# Patient Record
Sex: MALE | Race: WHITE | NOT HISPANIC OR LATINO | Employment: FULL TIME | URBAN - METROPOLITAN AREA
[De-identification: names, ages, dates, MRNs, and addresses within clinical notes are randomized per-mention and may not be internally consistent; named-entity substitution may affect disease eponyms.]

---

## 2019-02-14 ENCOUNTER — HOSPITAL ENCOUNTER (EMERGENCY)
Facility: HOSPITAL | Age: 34
Discharge: HOME | End: 2019-02-14
Attending: EMERGENCY MEDICINE | Admitting: EMERGENCY MEDICINE

## 2019-02-14 VITALS
TEMPERATURE: 97.1 F | SYSTOLIC BLOOD PRESSURE: 137 MMHG | OXYGEN SATURATION: 99 % | RESPIRATION RATE: 16 BRPM | DIASTOLIC BLOOD PRESSURE: 96 MMHG | WEIGHT: 235 LBS | HEART RATE: 88 BPM | BODY MASS INDEX: 33.64 KG/M2 | HEIGHT: 70 IN

## 2019-02-14 DIAGNOSIS — B34.9 VIRAL SYNDROME: Primary | ICD-10-CM

## 2019-02-14 PROCEDURE — 99282 EMERGENCY DEPT VISIT SF MDM: CPT

## 2019-02-14 RX ORDER — OSELTAMIVIR PHOSPHATE 75 MG/1
75 CAPSULE ORAL 2 TIMES DAILY
Qty: 10 CAPSULE | Refills: 0 | Status: SHIPPED | OUTPATIENT
Start: 2019-02-14 | End: 2019-02-19

## 2019-02-14 RX ORDER — ONDANSETRON 4 MG/1
4-8 TABLET, ORALLY DISINTEGRATING ORAL EVERY 8 HOURS PRN
Qty: 15 TABLET | Refills: 0 | Status: SHIPPED | OUTPATIENT
Start: 2019-02-14

## 2019-02-14 NOTE — ED NOTES
"Patient states \"yesterday I had flu like symptoms, and basically I just need a Drs note. I am in the  and can only go to [place] or the ER.\"     Mendy Fabian  02/14/19 9502    "

## 2019-02-14 NOTE — ED NOTES
"Patient walked to exam room 30, stated on way to room \"I really just need a work note, works policy is you can have 1 day off, then the next day you need a Drs note, and I can not drive to [place] right now\"  Report given to Mendy Vazquez RN  02/14/19 0737    "

## 2019-02-14 NOTE — ED PROVIDER NOTES
EMERGENCY DEPARTMENT ENCOUNTER    Room Number:  30/30  Date seen:  2/14/2019  Time seen: 7:36 AM  PCP: Pedro Pablo Pollack PA    HPI:  Chief complaint: Flu-like symptoms  Context:Chris Barry is a 33 y.o. male who presents to the ED with c/o influenza-like symptoms including generalized myalgias, HA, fever, rhinorrhea, and N/V/D for 2 days ago. Pt took Tylenol at 5:00AM for his fever. Pt has been able to tolerate fluids PO. He denies abd pain, sore throat, cough, SOA, and CP. He states his wife has been sick with similar sx. He's in the  and states he came to the ER because he has to have a work note for today.    Onset: gradual  Location:generalized  Duration: 2 days  Timing: constant  Character: flu-like symptoms  Aggravating Factors: pt has been around his wife that was sick  Alleviating Factors: none  Severity: moderate      ALLERGIES  Patient has no known allergies.    PAST MEDICAL HISTORY  Active Ambulatory Problems     Diagnosis Date Noted   • No Active Ambulatory Problems     Resolved Ambulatory Problems     Diagnosis Date Noted   • No Resolved Ambulatory Problems     No Additional Past Medical History       PAST SURGICAL HISTORY  No past surgical history on file.    FAMILY HISTORY  No family history on file.    SOCIAL HISTORY  Social History     Socioeconomic History   • Marital status:      Spouse name: Not on file   • Number of children: Not on file   • Years of education: Not on file   • Highest education level: Not on file   Social Needs   • Financial resource strain: Not on file   • Food insecurity - worry: Not on file   • Food insecurity - inability: Not on file   • Transportation needs - medical: Not on file   • Transportation needs - non-medical: Not on file   Occupational History   • Not on file   Tobacco Use   • Smoking status: Never Smoker   Substance and Sexual Activity   • Alcohol use: No   • Drug use: No   • Sexual activity: Not on file   Other Topics Concern   • Not on file    Social History Narrative   • Not on file       REVIEW OF SYSTEMS  Review of Systems   Constitutional: Positive for fever. Negative for chills.   HENT: Positive for rhinorrhea. Negative for congestion and sore throat.    Eyes: Negative.    Respiratory: Negative for cough and shortness of breath.    Cardiovascular: Negative for chest pain.   Gastrointestinal: Positive for diarrhea, nausea and vomiting. Negative for abdominal pain.   Genitourinary: Negative.    Musculoskeletal: Positive for myalgias (generalized).   Skin: Negative.    Neurological: Positive for headaches.   Psychiatric/Behavioral: Negative.        PHYSICAL EXAM  ED Triage Vitals [02/14/19 0734]   Temp Heart Rate Resp BP SpO2   97.1 °F (36.2 °C) 88 16 -- 99 %      Temp src Heart Rate Source Patient Position BP Location FiO2 (%)   Tympanic Monitor -- -- --     Physical Exam   Constitutional: He is oriented to person, place, and time and well-developed, well-nourished, and in no distress.   HENT:   Head: Normocephalic and atraumatic.   Right Ear: Tympanic membrane and external ear normal.   Left Ear: Tympanic membrane and external ear normal.   Nose: Nose normal.   Mouth/Throat: Uvula is midline and oropharynx is clear and moist. No oropharyngeal exudate, posterior oropharyngeal edema or posterior oropharyngeal erythema.   Eyes: Conjunctivae are normal.   Neck: Normal range of motion. Neck supple.   Cardiovascular: Normal rate and regular rhythm.   Pulmonary/Chest: Effort normal and breath sounds normal.   Abdominal:   Normal bowel sounds  Soft  Nontender  Nondistended  No rebound, guarding, or rigidity  No appreciable organomegaly  No CVA tenderness   Musculoskeletal: Normal range of motion.   Neurological: He is alert and oriented to person, place, and time.   Skin: Skin is warm and dry.   Psychiatric: Affect normal.   Nursing note and vitals reviewed.      PROGRESS AND CONSULTS   7:45 AM  Offered patient workup for symptoms, he declines. He states he  "feels to unwell to go to work, but otherwise would not have come to the ER. Discussed plan to discharge with Tamiflu due to flu-like symptoms, Zofran for nausea and work note. Pt understands and agrees with the plan, all questions answered. Advised pt to rest and increase fluid intake.  Return precautions and follow up instructions discussed.   Reviewed pt's history and workup with Dr. Fernandez.  After a bedside evaluation; Dr. Fernandez agrees with the plan of care      Disposition vitals:  /96   Pulse 88   Temp 97.1 °F (36.2 °C) (Tympanic)   Resp 16   Ht 177.8 cm (70\")   Wt 107 kg (235 lb)   SpO2 99%   BMI 33.72 kg/m²       DIAGNOSIS  Final diagnoses:   Viral syndrome       DISCHARGE    Patient discharged in stable condition.    Reviewed implications of results, diagnosis, meds, responsibility to follow up, warning signs and symptoms of possible worsening, potential complications and reasons to return to ER.    Patient/Family voiced understanding of above instructions.    Discussed plan for discharge, as there is no emergent indication for admission. Patient referred to primary care provider for BP management due to today's BP. Pt/family is agreeable and understands need for follow up and repeat testing.  Pt is aware that discharge does not mean that nothing is wrong but it indicates no emergency is present that requires admission and they must continue care with follow-up as given below or physician of their choice.     FOLLOW-UP  Pedro Pablo Pollack, PA  [address] [id number] [phone]    In 3 days           Medication List      New Prescriptions    ondansetron ODT 4 MG disintegrating tablet  Commonly known as:  ZOFRAN-ODT  Take 1-2 tablets by mouth Every 8 (Eight) Hours As Needed for Nausea or   Vomiting.     oseltamivir 75 MG capsule  Commonly known as:  TAMIFLU  Take 1 capsule by mouth 2 (Two) Times a Day for 5 days.              Documentation assistance provided by john " Alina Cook for Argelia Ojeda PA-C.  Information recorded by the scribe was done at my direction and has been verified and validated by me.         Alina Cook  02/14/19 0758       Argelia Ojeda PA  02/19/19 0804       Argelia Ojeda PA  02/19/19 0806

## 2019-02-14 NOTE — ED PROVIDER NOTES
"Pt presents to the ED c/o flu-like symptoms that started 2 days ago. Pt c/o generalized myalgias, \"pounding\" headache, fever T-max 102, diarrhea, vomiting, and rhinorrhea. Pt denies sore throat and cough. Pt states he has been around his wife who was recently diagnosed with the flu and had similar symptoms to the pt. Pt states he took tylenol this morning.      PHYSICAL EXAM  GENERAL: non-toxic appearing  HENT: oropharynx benign, moist mucous membranes  NECK: supple  RESPIRATORY: lungs clear  ABDOMEN: soft, non-tender    Vital signs and nursing notes reviewed.    LAB RESULTS AND RADIOLOGY  I have reviewed the patient's labs and imaging studies.    PROGRESS NOTES    Spoke to midlevel provider , [place] Ojeda PA-C, about the pt. After performing my own physical exam, I will order the pt tamiflu and discharge the pt home.     Attestation:    The JOYCELYN and I have discussed this patient's history, physical exam, and treatment plan.  I have reviewed the documentation and personally had a face to face interaction with the patient. I affirm the documentation and agree with the treatment and plan.  The attached note describes my personal findings.    Documentation assistance provided by john Albert for Dr. Fernandez.  Information recorded by the scribe was done at my direction and has been verified and validated by me.     Shayan Albert  02/14/19 6113       Evangelist Fernandez MD  02/14/19 9636    "

## 2019-02-14 NOTE — DISCHARGE INSTRUCTIONS
Rest and drink plenty of fluids. Progress your diet slowly as tolerated.  Recheck with your PCP   Take the medications as prescribed.  If you have severe diarrhea, Imodium may help, but could also prolong your diarrhea symptoms.  Return to the ER for severe pain, intractable vomiting, fever >100.4, new or worsening symptoms, any concerns.

## 2019-04-24 ENCOUNTER — HOSPITAL ENCOUNTER (EMERGENCY)
Facility: HOSPITAL | Age: 34
Discharge: HOME | End: 2019-04-24
Attending: EMERGENCY MEDICINE | Admitting: EMERGENCY MEDICINE

## 2019-04-24 VITALS
DIASTOLIC BLOOD PRESSURE: 75 MMHG | RESPIRATION RATE: 16 BRPM | WEIGHT: 200 LBS | SYSTOLIC BLOOD PRESSURE: 124 MMHG | HEIGHT: 70 IN | BODY MASS INDEX: 28.63 KG/M2 | TEMPERATURE: 99.6 F | HEART RATE: 90 BPM | OXYGEN SATURATION: 96 %

## 2019-04-24 DIAGNOSIS — J02.0 STREP PHARYNGITIS: Primary | ICD-10-CM

## 2019-04-24 LAB — S PYO AG THROAT QL: POSITIVE

## 2019-04-24 PROCEDURE — 25010000002 PENICILLIN G BENZATHINE PER 1200000 UNITS: Performed by: EMERGENCY MEDICINE

## 2019-04-24 PROCEDURE — 96372 THER/PROPH/DIAG INJ SC/IM: CPT

## 2019-04-24 PROCEDURE — 99283 EMERGENCY DEPT VISIT LOW MDM: CPT

## 2019-04-24 PROCEDURE — 87880 STREP A ASSAY W/OPTIC: CPT | Performed by: EMERGENCY MEDICINE

## 2019-04-24 RX ORDER — ACETAMINOPHEN 500 MG
1000 TABLET ORAL ONCE
Status: COMPLETED | OUTPATIENT
Start: 2019-04-24 | End: 2019-04-24

## 2019-04-24 RX ADMIN — PENICILLIN G BENZATHINE 1.2 MILLION UNITS: 1200000 INJECTION, SUSPENSION INTRAMUSCULAR at 09:11

## 2019-04-24 RX ADMIN — ACETAMINOPHEN 1000 MG: 500 TABLET, FILM COATED ORAL at 09:10

## 2019-10-04 ENCOUNTER — APPOINTMENT (OUTPATIENT)
Dept: CT IMAGING | Facility: HOSPITAL | Age: 34
End: 2019-10-04

## 2019-10-04 ENCOUNTER — HOSPITAL ENCOUNTER (EMERGENCY)
Facility: HOSPITAL | Age: 34
Discharge: HOME | End: 2019-10-04
Attending: EMERGENCY MEDICINE | Admitting: EMERGENCY MEDICINE

## 2019-10-04 VITALS
WEIGHT: 210 LBS | TEMPERATURE: 97.7 F | RESPIRATION RATE: 18 BRPM | DIASTOLIC BLOOD PRESSURE: 83 MMHG | OXYGEN SATURATION: 98 % | HEIGHT: 70 IN | BODY MASS INDEX: 30.06 KG/M2 | SYSTOLIC BLOOD PRESSURE: 130 MMHG | HEART RATE: 65 BPM

## 2019-10-04 DIAGNOSIS — K52.9 COLITIS: Primary | ICD-10-CM

## 2019-10-04 DIAGNOSIS — R19.7 BLOODY DIARRHEA: ICD-10-CM

## 2019-10-04 LAB
ADV 40+41 DNA STL QL NAA+NON-PROBE: NOT DETECTED
ALBUMIN SERPL-MCNC: 4.5 G/DL (ref 3.5–5.2)
ALBUMIN/GLOB SERPL: 1.7 G/DL
ALP SERPL-CCNC: 64 U/L (ref 39–117)
ALT SERPL W P-5'-P-CCNC: 32 U/L (ref 1–41)
ANION GAP SERPL CALCULATED.3IONS-SCNC: 10 MMOL/L (ref 5–15)
AST SERPL-CCNC: 28 U/L (ref 1–40)
ASTRO TYP 1-8 RNA STL QL NAA+NON-PROBE: NOT DETECTED
BASOPHILS # BLD AUTO: 0.04 10*3/MM3 (ref 0–0.2)
BASOPHILS NFR BLD AUTO: 0.3 % (ref 0–1.5)
BILIRUB SERPL-MCNC: 0.4 MG/DL (ref 0.2–1.2)
BUN BLD-MCNC: 17 MG/DL (ref 6–20)
BUN/CREAT SERPL: 15.2 (ref 7–25)
C CAYETANENSIS DNA STL QL NAA+NON-PROBE: NOT DETECTED
CALCIUM SPEC-SCNC: 8.8 MG/DL (ref 8.6–10.5)
CAMPY SP DNA.DIARRHEA STL QL NAA+PROBE: NOT DETECTED
CHLORIDE SERPL-SCNC: 101 MMOL/L (ref 98–107)
CO2 SERPL-SCNC: 26 MMOL/L (ref 22–29)
CREAT BLD-MCNC: 1.12 MG/DL (ref 0.76–1.27)
CRYPTOSP STL CULT: NOT DETECTED
DEPRECATED RDW RBC AUTO: 41.8 FL (ref 37–54)
E COLI DNA SPEC QL NAA+PROBE: NOT DETECTED
E HISTOLYT AG STL-ACNC: NOT DETECTED
EAEC PAA PLAS AGGR+AATA ST NAA+NON-PRB: NOT DETECTED
EC STX1 + STX2 GENES STL NAA+PROBE: NOT DETECTED
EOSINOPHIL # BLD AUTO: 0.14 10*3/MM3 (ref 0–0.4)
EOSINOPHIL NFR BLD AUTO: 1.1 % (ref 0.3–6.2)
EPEC EAE GENE STL QL NAA+NON-PROBE: NOT DETECTED
ERYTHROCYTE [DISTWIDTH] IN BLOOD BY AUTOMATED COUNT: 13.8 % (ref 12.3–15.4)
ETEC LTA+ST1A+ST1B TOX ST NAA+NON-PROBE: NOT DETECTED
EXPIRATION DATE: ABNORMAL
FECAL OCCULT BLOOD SCREEN, POC: POSITIVE
G LAMBLIA DNA SPEC QL NAA+PROBE: NOT DETECTED
GFR SERPL CREATININE-BSD FRML MDRD: 76 ML/MIN/1.73
GLOBULIN UR ELPH-MCNC: 2.6 GM/DL
GLUCOSE BLD-MCNC: 90 MG/DL (ref 65–99)
HCT VFR BLD AUTO: 40.2 % (ref 37.5–51)
HGB BLD-MCNC: 13.4 G/DL (ref 13–17.7)
HOLD SPECIMEN: NORMAL
IMM GRANULOCYTES # BLD AUTO: 0.04 10*3/MM3 (ref 0–0.05)
IMM GRANULOCYTES NFR BLD AUTO: 0.3 % (ref 0–0.5)
LYMPHOCYTES # BLD AUTO: 3.6 10*3/MM3 (ref 0.7–3.1)
LYMPHOCYTES NFR BLD AUTO: 27.7 % (ref 19.6–45.3)
Lab: 128
MCH RBC QN AUTO: 27.6 PG (ref 26.6–33)
MCHC RBC AUTO-ENTMCNC: 33.3 G/DL (ref 31.5–35.7)
MCV RBC AUTO: 82.9 FL (ref 79–97)
MONOCYTES # BLD AUTO: 1.12 10*3/MM3 (ref 0.1–0.9)
MONOCYTES NFR BLD AUTO: 8.6 % (ref 5–12)
NEGATIVE CONTROL: NEGATIVE
NEUTROPHILS # BLD AUTO: 8.04 10*3/MM3 (ref 1.7–7)
NEUTROPHILS NFR BLD AUTO: 62 % (ref 42.7–76)
NOROVIRUS GI+II RNA STL QL NAA+NON-PROBE: NOT DETECTED
NRBC BLD AUTO-RTO: 0 /100 WBC (ref 0–0.2)
P SHIGELLOIDES DNA STL QL NAA+PROBE: NOT DETECTED
PLATELET # BLD AUTO: 325 10*3/MM3 (ref 140–450)
PMV BLD AUTO: 9.3 FL (ref 6–12)
POSITIVE CONTROL: POSITIVE
POTASSIUM BLD-SCNC: 3.7 MMOL/L (ref 3.5–5.2)
PROT SERPL-MCNC: 7.1 G/DL (ref 6–8.5)
RBC # BLD AUTO: 4.85 10*6/MM3 (ref 4.14–5.8)
RV RNA STL NAA+PROBE: NOT DETECTED
SALMONELLA DNA SPEC QL NAA+PROBE: NOT DETECTED
SAPO I+II+IV+V RNA STL QL NAA+NON-PROBE: NOT DETECTED
SHIGELLA SP+EIEC IPAH STL QL NAA+PROBE: NOT DETECTED
SODIUM BLD-SCNC: 137 MMOL/L (ref 136–145)
V CHOLERAE DNA SPEC QL NAA+PROBE: NOT DETECTED
VIBRIO DNA SPEC NAA+PROBE: NOT DETECTED
WBC NRBC COR # BLD: 12.98 10*3/MM3 (ref 3.4–10.8)
WHOLE BLOOD HOLD SPECIMEN: NORMAL
YERSINIA STL CULT: NOT DETECTED

## 2019-10-04 PROCEDURE — 99283 EMERGENCY DEPT VISIT LOW MDM: CPT

## 2019-10-04 PROCEDURE — 25010000002 IOPAMIDOL 61 % SOLUTION: Performed by: EMERGENCY MEDICINE

## 2019-10-04 PROCEDURE — 85025 COMPLETE CBC W/AUTO DIFF WBC: CPT | Performed by: EMERGENCY MEDICINE

## 2019-10-04 PROCEDURE — 96374 THER/PROPH/DIAG INJ IV PUSH: CPT

## 2019-10-04 PROCEDURE — 0097U HC BIOFIRE FILMARRAY GI PANEL: CPT | Performed by: EMERGENCY MEDICINE

## 2019-10-04 PROCEDURE — 82270 OCCULT BLOOD FECES: CPT | Performed by: EMERGENCY MEDICINE

## 2019-10-04 PROCEDURE — 80053 COMPREHEN METABOLIC PANEL: CPT | Performed by: EMERGENCY MEDICINE

## 2019-10-04 PROCEDURE — 25010000002 ONDANSETRON PER 1 MG: Performed by: EMERGENCY MEDICINE

## 2019-10-04 PROCEDURE — 74177 CT ABD & PELVIS W/CONTRAST: CPT

## 2019-10-04 RX ORDER — SODIUM CHLORIDE 0.9 % (FLUSH) 0.9 %
10 SYRINGE (ML) INJECTION AS NEEDED
Status: DISCONTINUED | OUTPATIENT
Start: 2019-10-04 | End: 2019-10-04 | Stop reason: HOSPADM

## 2019-10-04 RX ORDER — CIPROFLOXACIN 500 MG/1
500 TABLET, FILM COATED ORAL 2 TIMES DAILY
Qty: 14 TABLET | Refills: 0 | Status: SHIPPED | OUTPATIENT
Start: 2019-10-04 | End: 2019-10-11

## 2019-10-04 RX ORDER — ONDANSETRON 2 MG/ML
4 INJECTION INTRAMUSCULAR; INTRAVENOUS ONCE
Status: COMPLETED | OUTPATIENT
Start: 2019-10-04 | End: 2019-10-04

## 2019-10-04 RX ORDER — METRONIDAZOLE 500 MG/1
500 TABLET ORAL 4 TIMES DAILY
Qty: 28 TABLET | Refills: 0 | Status: SHIPPED | OUTPATIENT
Start: 2019-10-04 | End: 2019-10-11

## 2019-10-04 RX ADMIN — SODIUM CHLORIDE, POTASSIUM CHLORIDE, SODIUM LACTATE AND CALCIUM CHLORIDE 1000 ML: 600; 310; 30; 20 INJECTION, SOLUTION INTRAVENOUS at 16:45

## 2019-10-04 RX ADMIN — IOPAMIDOL 85 ML: 612 INJECTION, SOLUTION INTRAVENOUS at 20:03

## 2019-10-04 RX ADMIN — ONDANSETRON 4 MG: 2 INJECTION INTRAMUSCULAR; INTRAVENOUS at 16:45

## 2019-10-04 NOTE — ED NOTES
Pt c/o nausea and diarrhea 2days ago. Yesterday symptoms subsided. This afternoon pt reports diarrhea with lower abd pain. Reports blood on stool. Denies fever. Pt flushed. Bowel sounds present in all quadrants. Pt tender to lower abd pain     Deb Yee, RN  10/04/19 0012

## 2019-10-04 NOTE — ED PROVIDER NOTES
EMERGENCY DEPARTMENT ENCOUNTER    Room Number:  36/36  Date seen:  10/4/2019  Time seen: 4:24 PM  PCP: Provider, No Known  Historian: patient    HPI:  Chief Complaint: diarrhea  A complete HPI/ROS/PMH/PSH/SH/FH are unobtainable due to: nothing  Context: Chris Barry is a 34 y.o. male who presents to the ED c/o diarrhea that began two days ago. The patient reports he has had 12 episodes of diarrhea within the past 24 hours. The patient also complains of associated nausea and suprapubic abdominal pain. The patient reports his symptoms subsided yesterday evening but then returned around 1200 this afternoon. The patient also reports he noticed blood in his stool earlier this afternoon, so he came to the ER for further evaluation. The patient denies vomiting or fever. The patient also denies recent travel outside the country, exposure to farm animals, hospitalizations, or antibiotic use. There are no other complaints at this time.    Location: GI  Quality: diarrhea  Intensity/Severity: moderate  Duration: two days  Onset quality: gradual  Timing: episodic  Progression: none specified  Aggravating Factors: none specified  Alleviating Factors: none specified  Previous Episodes: none specified  Treatment before arrival: none specified  Associated Symptoms: nausea, suprapubic abdominal pain, and blood in his stool    PAST MEDICAL HISTORY  Active Ambulatory Problems     Diagnosis Date Noted   • No Active Ambulatory Problems     Resolved Ambulatory Problems     Diagnosis Date Noted   • No Resolved Ambulatory Problems     Past Medical History:   Diagnosis Date   • Psoriasis          PAST SURGICAL HISTORY  Past Surgical History:   Procedure Laterality Date   • CYST REMOVAL     • FOOT SURGERY Left    • TOE SURGERY           FAMILY HISTORY  History reviewed. No pertinent family history.      SOCIAL HISTORY  Social History     Socioeconomic History   • Marital status:      Spouse name: Not on file   • Number of children:  Not on file   • Years of education: Not on file   • Highest education level: Not on file   Tobacco Use   • Smoking status: Never Smoker   • Smokeless tobacco: Never Used   Substance and Sexual Activity   • Alcohol use: No   • Drug use: No   • Sexual activity: Defer         ALLERGIES  Patient has no known allergies.        REVIEW OF SYSTEMS  Review of Systems     All systems reviewed and negative except for those discussed in HPI.       PHYSICAL EXAM  ED Triage Vitals [10/04/19 1612]   Temp Heart Rate Resp BP SpO2   97.7 °F (36.5 °C) 78 18 -- 98 %      Temp src Heart Rate Source Patient Position BP Location FiO2 (%)   -- -- -- -- --         GENERAL: not distressed  HENT: nares patent, slightly dry mucous membranes  EYES: no scleral icterus  CV: regular rhythm, regular rate  RESPIRATORY: normal effort  ABDOMEN: soft, nontender   : performed chaperoned rectal exam with RN Ioana bedside, there is brown stool which is heme occult positive, no external hemorrhoids appreciated  MUSCULOSKELETAL: no deformity  NEURO: alert, moves all extremities, follows commands  SKIN: warm, dry    Vital signs and nursing notes reviewed.      LAB RESULTS  Recent Results (from the past 24 hour(s))   Light Blue Top    Collection Time: 10/04/19  4:33 PM   Result Value Ref Range    Extra Tube hold for add-on    Gold Top - SST    Collection Time: 10/04/19  4:33 PM   Result Value Ref Range    Extra Tube Hold for add-ons.    Comprehensive Metabolic Panel    Collection Time: 10/04/19  4:34 PM   Result Value Ref Range    Glucose 90 65 - 99 mg/dL    BUN 17 6 - 20 mg/dL    Creatinine 1.12 0.76 - 1.27 mg/dL    Sodium 137 136 - 145 mmol/L    Potassium 3.7 3.5 - 5.2 mmol/L    Chloride 101 98 - 107 mmol/L    CO2 26.0 22.0 - 29.0 mmol/L    Calcium 8.8 8.6 - 10.5 mg/dL    Total Protein 7.1 6.0 - 8.5 g/dL    Albumin 4.50 3.50 - 5.20 g/dL    ALT (SGPT) 32 1 - 41 U/L    AST (SGOT) 28 1 - 40 U/L    Alkaline Phosphatase 64 39 - 117 U/L    Total Bilirubin 0.4  0.2 - 1.2 mg/dL    eGFR Non African Amer 76 >60 mL/min/1.73    Globulin 2.6 gm/dL    A/G Ratio 1.7 g/dL    BUN/Creatinine Ratio 15.2 7.0 - 25.0    Anion Gap 10.0 5.0 - 15.0 mmol/L   CBC Auto Differential    Collection Time: 10/04/19  4:34 PM   Result Value Ref Range    WBC 12.98 (H) 3.40 - 10.80 10*3/mm3    RBC 4.85 4.14 - 5.80 10*6/mm3    Hemoglobin 13.4 13.0 - 17.7 g/dL    Hematocrit 40.2 37.5 - 51.0 %    MCV 82.9 79.0 - 97.0 fL    MCH 27.6 26.6 - 33.0 pg    MCHC 33.3 31.5 - 35.7 g/dL    RDW 13.8 12.3 - 15.4 %    RDW-SD 41.8 37.0 - 54.0 fl    MPV 9.3 6.0 - 12.0 fL    Platelets 325 140 - 450 10*3/mm3    Neutrophil % 62.0 42.7 - 76.0 %    Lymphocyte % 27.7 19.6 - 45.3 %    Monocyte % 8.6 5.0 - 12.0 %    Eosinophil % 1.1 0.3 - 6.2 %    Basophil % 0.3 0.0 - 1.5 %    Immature Grans % 0.3 0.0 - 0.5 %    Neutrophils, Absolute 8.04 (H) 1.70 - 7.00 10*3/mm3    Lymphocytes, Absolute 3.60 (H) 0.70 - 3.10 10*3/mm3    Monocytes, Absolute 1.12 (H) 0.10 - 0.90 10*3/mm3    Eosinophils, Absolute 0.14 0.00 - 0.40 10*3/mm3    Basophils, Absolute 0.04 0.00 - 0.20 10*3/mm3    Immature Grans, Absolute 0.04 0.00 - 0.05 10*3/mm3    nRBC 0.0 0.0 - 0.2 /100 WBC   POC Occult Blood Stool    Collection Time: 10/04/19  4:41 PM   Result Value Ref Range    Fecal Occult Blood Positive (A) Negative    Lot Number [id number]     Expiration Date 05/31/2020     Positive Control Positive Positive    Negative Control Negative Negative   Gastrointestinal Panel, PCR - Stool, Per Rectum    Collection Time: 10/04/19  5:20 PM   Result Value Ref Range    Campylobacter Not Detected Not Detected, Invalid    Plesiomonas shigelloides Not Detected Not Detected    Salmonella Not Detected Not Detected    Vibrio Not Detected Not Detected    Vibrio cholerae Not Detected Not Detected    Yersinia enterocolitica Not Detected Not Detected    Enteroaggregative E. coli (EAEC) Not Detected Not Detected    Enteropathogenic E. coli (EPEC) Not Detected Not Detected     Enterotoxigenic E. coli (ETEC) lt/st Not Detected Not Detected    Shiga-like toxin-producing E. coli (STEC) stx1/stx2 Not Detected Not Detected    E. coli O157 Not Detected Not Detected    Shigella/Enteroinvasive E. coli (EIEC) Not Detected Not Detected    Cryptosporidium Not Detected Not Detected, Invalid    Cyclospora cayetanensis Not Detected Not Detected    Entamoeba histolytica Not Detected Not Detected    Giardia lamblia Not Detected Not Detected    Adenovirus F40/41 Not Detected Not Detected    Astrovirus Not Detected Not Detected    Norovirus GI/GII Not Detected Not Detected    Rotavirus A Not Detected Not Detected    Sapovirus (I, II, IV or V) Not Detected Not Detected       Ordered the above labs and reviewed the results.        RADIOLOGY  Ct Abdomen Pelvis With Contrast    Result Date: 10/4/2019  CT OF THE ABDOMEN AND PELVIS WITH CONTRAST 10/04/2019  HISTORY: Low abdominal pain.  TECHNIQUE: Axial images were obtained from the lung bases to the symphysis pubis after intravenous contrast.  FINDINGS: The liver, gallbladder, spleen, pancreas, adrenals and kidneys appear unremarkable.  There is wall thickening of the mid to distal descending colon and of the rectosigmoid colon.  No abscess or free air is seen. Urinary bladder is unremarkable.      Findings consistent with mild-to-moderate colitis of the mid to distal descending and rectosigmoid colon.  Radiation dose reduction techniques were utilized, including automated exposure control and exposure modulation based on body size.         I ordered the above noted radiological studies. Reviewed by me and discussed with radiologist.  See dictation for official radiology interpretation.        PROCEDURES  Procedures      MEDICATIONS GIVEN IN ER  Medications   sodium chloride 0.9 % flush 10 mL (not administered)   lactated ringers bolus 1,000 mL (0 mL Intravenous Stopped 10/4/19 1927)   ondansetron (ZOFRAN) injection 4 mg (4 mg Intravenous Given 10/4/19 1645)    iopamidol (ISOVUE-300) 61 % injection 100 mL (85 mL Intravenous Given by Other 10/4/19 2003)         PROGRESS, DATA ANALYSIS, CONSULTS, AND MEDICAL DECISION MAKING    All labs have been independently reviewed by me.  All radiology studies have been reviewed by me and discussed with radiologist dictating the report.   EKG's independently viewed and interpreted by me.  Discussion below represents my analysis of pertinent findings related to patient's condition, differential diagnosis, treatment plan and final disposition.      ED Course as of Oct 04 2032   Fri Oct 04, 2019   1654 Fecal Occult Blood: (!) Positive [TD]   1654 WBC: (!) 12.98 [TD]   2012 Given the patient's abdominal pain without an infectious etiology notified on his stool studies combined with bloody diarrhea, I have ordered a CT scan to evaluate for ischemic colitis.  [TD]      ED Course User Index  [TD] Derik Adan II, MD     CT scan shows colitis.  No other intra-abdominal pathology.    I spoke with Dr. kennedy, radiology to discuss the CT scan findings.      Patient appears to be clinically hydrated at this time.  He is tolerating p.o.  I believe that he is a good candidate for outpatient management given that he is young, healthy and clinically well-appearing.      Differential diagnosis includes virus, bacteria, ischemic colitis, ulcerative colitis or other inflammatory bowel disease.  I discussed the patient return precautions in detail.  Also discussed indications for follow-up with GI as this could be an initial presentation for inflammatory bowel disease.    1909 Rechecked the patient who is resting comfortably and in NAD. Patient is stable. BP- 139/85 HR- 65 Temp- 97.7 °F (36.5 °C) O2 sat- 98%. Informed the patient of his blood work which shows a WBC of 12.98. Also informed the patient of his negative GI Panel and his positive Fecal Occult test. Discussed the plan to check a CT Abd/Pelvis for further evaluation. Pt understands and  agrees with the plan, all questions answered.    2033 Rechecked the patient who is resting comfortably and in NAD. Patient is stable. BP- 130/83 HR- 65 Temp- 97.7 °F (36.5 °C) O2 sat- 98%. Informed the patient of his CT Abd/Pelvis which shows mild-to-moderate colitis of the mid to distal descending and rectosigmoid colon. Discussed the plan for discharge with a prescription for Cipro and Flagyl (to treat colitis) and instructions to f/u with GI for further evaluation and management. Strict RTER warnings given. Pt understands and agrees with the plan, all questions answered.      AS OF 8:32 PM VITALS:    BP - 130/83  HR - 65  TEMP - 97.7 °F (36.5 °C)  02 SATS - 98%        DIAGNOSIS  Final diagnoses:   Colitis   Bloody diarrhea         DISPOSITION  DISCHARGE    Patient discharged in stable condition.    Reviewed implications of results, diagnosis, meds, responsibility to follow up, warning signs and symptoms of possible worsening, potential complications and reasons to return to ER, including any new or worsening symptoms.    Patient/Family voiced understanding of above instructions.    Discussed plan for discharge, as there is no emergent indication for admission. Patient referred to primary care provider for BP management due to today's BP. Pt/family is agreeable and understands need for follow up and repeat testing.  Pt is aware that discharge does not mean that nothing is wrong but it indicates no emergency is present that requires admission and they must continue care with follow-up as given below or physician of their choice.     FOLLOW-UP  Melissa Appiah MD  [address] [address]  [phone]    Schedule an appointment as soon as possible for a visit   If symptoms worsen         Medication List      New Prescriptions    ciprofloxacin 500 MG tablet  Commonly known as:  CIPRO  Take 1 tablet by mouth 2 (Two) Times a Day for 7 days.     metroNIDAZOLE 500 MG tablet  Commonly known as:   FLAGYL  Take 1 tablet by mouth 4 (Four) Times a Day for 7 days.              --  Documentation assistance provided by john Wayne for Dr. Derik Adan MD.  Information recorded by the scribe was done at my direction and has been verified and validated by me.     Kelly Wayne  10/04/19 2038       Derik Adan II, MD  10/04/19 8421

## 2020-01-06 ENCOUNTER — APPOINTMENT (OUTPATIENT)
Dept: CT IMAGING | Facility: HOSPITAL | Age: 35
End: 2020-01-06

## 2020-01-06 ENCOUNTER — HOSPITAL ENCOUNTER (EMERGENCY)
Facility: HOSPITAL | Age: 35
Discharge: HOME | End: 2020-01-06
Attending: EMERGENCY MEDICINE | Admitting: EMERGENCY MEDICINE

## 2020-01-06 VITALS
SYSTOLIC BLOOD PRESSURE: 134 MMHG | TEMPERATURE: 96.7 F | HEIGHT: 70 IN | HEART RATE: 57 BPM | RESPIRATION RATE: 18 BRPM | BODY MASS INDEX: 30.13 KG/M2 | OXYGEN SATURATION: 99 % | DIASTOLIC BLOOD PRESSURE: 88 MMHG

## 2020-01-06 DIAGNOSIS — F07.81 POST CONCUSSIVE SYNDROME: Primary | ICD-10-CM

## 2020-01-06 PROCEDURE — 99283 EMERGENCY DEPT VISIT LOW MDM: CPT

## 2020-01-06 PROCEDURE — 70450 CT HEAD/BRAIN W/O DYE: CPT

## 2020-01-07 NOTE — ED PROVIDER NOTES
EMERGENCY DEPARTMENT ENCOUNTER    CHIEF COMPLAINT  Chief Complaint: head pain  History given by: patient  History limited by: none  Room Number: 02/02  PMD: Provider, No Known      HPI:  Pt is a 34 y.o. male who presents complaining of pain to top of head s/p having a 60 pound mitre saw fall on the top of his head off of a shelf occurring today PTA. Pt reports head pain was gradual in onset. Pt also complains of nausea but denies LOC. Pt states wife noticed that he was not acting himself and had slurred speech. Pt confirms consuming 1 drink of EtOH earlier today.     Duration:  Today PTA  Onset: gradual  Location: top of head  Associated Symptoms: nausea, slurred speech, not acting himself    PAST MEDICAL HISTORY  Active Ambulatory Problems     Diagnosis Date Noted   • No Active Ambulatory Problems     Resolved Ambulatory Problems     Diagnosis Date Noted   • No Resolved Ambulatory Problems     Past Medical History:   Diagnosis Date   • Psoriasis        PAST SURGICAL HISTORY  Past Surgical History:   Procedure Laterality Date   • CYST REMOVAL     • FOOT SURGERY Left    • TOE SURGERY         FAMILY HISTORY  No family history on file.    SOCIAL HISTORY  Social History     Socioeconomic History   • Marital status:      Spouse name: Not on file   • Number of children: Not on file   • Years of education: Not on file   • Highest education level: Not on file   Tobacco Use   • Smoking status: Never Smoker   • Smokeless tobacco: Never Used   Substance and Sexual Activity   • Alcohol use: No   • Drug use: No   • Sexual activity: Defer       ALLERGIES  Patient has no known allergies.    REVIEW OF SYSTEMS  Review of Systems   Constitutional: Negative for activity change, appetite change and fever.   HENT: Negative for congestion and sore throat.         Head pain to top of head   Eyes: Negative.    Respiratory: Negative for cough and shortness of breath.    Cardiovascular: Negative for chest pain and leg swelling.    Gastrointestinal: Positive for nausea. Negative for abdominal pain, diarrhea and vomiting.   Endocrine: Negative.    Genitourinary: Negative for decreased urine volume and dysuria.   Musculoskeletal: Negative for neck pain.   Skin: Negative for rash and wound.   Allergic/Immunologic: Negative.    Neurological: Positive for speech difficulty. Negative for weakness, numbness and headaches.   Hematological: Negative.    Psychiatric/Behavioral: Positive for confusion.   All other systems reviewed and are negative.      PHYSICAL EXAM  ED Triage Vitals [01/06/20 1906]   Temp Heart Rate Resp BP SpO2   96.7 °F (35.9 °C) 62 16 -- 99 %      Temp src Heart Rate Source Patient Position BP Location FiO2 (%)   Tympanic Monitor -- -- --       Physical Exam   Constitutional: He is oriented to person, place, and time. No distress.   HENT:   Head: Normocephalic and atraumatic.   No marks on head   Eyes: Pupils are equal, round, and reactive to light. EOM are normal.   Neck: Normal range of motion. Neck supple.   Cardiovascular: Normal rate, regular rhythm and normal heart sounds.   Pulmonary/Chest: Effort normal and breath sounds normal. No respiratory distress.   Abdominal: Soft. There is no tenderness. There is no rebound and no guarding.   Musculoskeletal: Normal range of motion. He exhibits no edema.   Neurological: He is alert and oriented to person, place, and time. He has normal sensation and normal strength.   Answered questions slow but appropriate   Skin: Skin is warm and dry.   Psychiatric: Mood and affect normal.   Nursing note and vitals reviewed.      RADIOLOGY  CT Head Without Contrast   Preliminary Result   An 11 x 10 x 13 mm ovoid area of encephalomalacia over the superior   right cerebellum likely a small old cerebellar infarct and please   correlate with clinical history. The remainder of the head CT is within   normal limits. Specifically no acute skull fracture or intracranial   hemorrhage is seen.         Radiation dose reduction techniques were utilized, including automated   exposure control and exposure modulation based on body size.                   I ordered the above noted radiological studies. Interpreted by radiologist. Discussed with radiologist (Dr. Reed). Reviewed by me in PACS.       PROCEDURES  Procedures      PROGRESS AND CONSULTS  ED Course as of Jan 06 2115   Mon Jan 06, 2020 2112 9:13 PM  Patient here for head injury.  Head CT negative.  Spot in the occiput noticed by neuroradiologist.  Patient states he has had this since he was a child.  Most likely post concussive syndrome.  Will discharge home.    [SL]      ED Course User Index  [SL] Roderick Valerio MD       1947 ordered head CT for further evaluation    2018 discussed pt case with Dr. Reed, radiology, who notes who notes no acute fracture or bleed but possible old CVA on head CT.     2110 pt rechecked and resting. Informed pt of imaging results. Discussed plan to discharge with instructions to f/u for outpatient MRI due to old CVA noted in CT head. Pt states this area was noticed when he was a child and had it followed. Pt is agreeable to discharge. All questions have been answered.     MEDICAL DECISION MAKING  Results were reviewed/discussed with the patient and they were also made aware of online access. Pt also made aware that some labs, such as cultures, will not be resulted during ER visit and follow up with PMD is necessary.     MDM  Number of Diagnoses or Management Options  Post concussive syndrome:      Amount and/or Complexity of Data Reviewed  Tests in the radiology section of CPT®: reviewed and ordered (Head CT: no acute fracture or bleed but possible an old CVA)  Discussion of test results with the performing providers: yes (Dr. Reed)  Review and summarize past medical records: yes  Independent visualization of images, tracings, or specimens: yes           DIAGNOSIS  Final diagnoses:   Post concussive syndrome        DISPOSITION  DISCHARGE    Patient discharged in stable condition.    Reviewed implications of results, diagnosis, meds, responsibility to follow up, warning signs and symptoms of possible worsening, potential complications and reasons to return to ER, including new or worsening sx.    Patient/Family voiced understanding of above instructions.    Discussed plan for discharge, as there is no emergent indication for admission. Patient referred to primary care provider for BP management due to today's BP. Pt/family is agreeable and understands need for follow up and repeat testing.  Pt is aware that discharge does not mean that nothing is wrong but it indicates no emergency is present that requires admission and they must continue care with follow-up as given below or physician of their choice.     FOLLOW-UP  PATIENT LIAISON [place] [address]  [phone]  Schedule an appointment as soon as possible for a visit            Medication List      No changes were made to your prescriptions during this visit.           Latest Documented Vital Signs:  As of 9:15 PM  BP- 128/87 HR- 65 Temp- 96.7 °F (35.9 °C) (Tympanic) O2 sat- 96%    --  Documentation assistance provided by john Garcia for Dr. Valerio.  Information recorded by the john was done at my direction and has been verified and validated by me.          Dominique Garcia  01/06/20 8279       Roderick Valerio MD  01/06/20 9632

## 2020-01-07 NOTE — ED TRIAGE NOTES
Was pulled 60 pound mitre saw off a shelf.  It hit him in the head.  Wife was telling him he's not acting himself and he's slurring his speech.  He is now nauseous

## 2021-12-03 ENCOUNTER — OFFICE VISIT (OUTPATIENT)
Dept: NEUROLOGY | Facility: CLINIC | Age: 36
End: 2021-12-03

## 2021-12-03 VITALS
SYSTOLIC BLOOD PRESSURE: 130 MMHG | HEART RATE: 70 BPM | BODY MASS INDEX: 32.07 KG/M2 | WEIGHT: 224 LBS | HEIGHT: 70 IN | DIASTOLIC BLOOD PRESSURE: 80 MMHG | OXYGEN SATURATION: 96 %

## 2021-12-03 DIAGNOSIS — G43.009 MIGRAINE WITHOUT AURA AND WITHOUT STATUS MIGRAINOSUS, NOT INTRACTABLE: Primary | ICD-10-CM

## 2021-12-03 PROCEDURE — 99204 OFFICE O/P NEW MOD 45 MIN: CPT | Performed by: PSYCHIATRY & NEUROLOGY

## 2021-12-03 RX ORDER — RIMEGEPANT SULFATE 75 MG/75MG
75 TABLET, ORALLY DISINTEGRATING ORAL ONCE AS NEEDED
Qty: 2 TABLET | Refills: 0 | COMMUNITY
Start: 2021-12-03

## 2021-12-03 NOTE — ASSESSMENT & PLAN NOTE
36 year old man with migraines.  Of note he had a head CT scan which I independently reviewed the images on his visit today and demonstrates evidence of chronic right cerebellar encephalomalacia which was followed up with a brain MRI scan on 5/21/2021 which re-demonstrated this finding which is thought to either be a chronic ischmia vs congenital developmental abnormality.  He reports a history of headaches starting in High School and he was evaluated at that time with head imaging and they informed him that this finding was present on his imaging and he had this further evaluated with a neurologist at that time.  He had repeated scanning for several years and since everything was stable this routine scanning was discontinued.  He tells me he had not had a headache in 15 years and in May 2021 had a severe migraine.  His headache was in the top of his head with a constant throbbing quality which he rates as 7/10 on pain scale 1-10 which lasted for about 5 hours.  He has not had a migraine since that time.  He had a concussion last year from saw falling on his head.  He has not been given anything for his migraines.  There is no family history of migraines.  I will give him samples of Nurtec ODT that he can try in case he has another migraine as this is safe even in someone who may have had a stroke though at this time given the fact that the findings were present on scans when he was around 14 years old makes it more likely a developmental abnormality.  At this time as these are infrequent I would not start a preventative medicine.  I also discussed migraine triggers and lifestyle modifications at length.

## 2021-12-03 NOTE — PROGRESS NOTES
Chief Complaint  Migraine (vision,on maxalt, began May 2021- h/o concussions- had severe HA as child-)    Subjective          Chris Barry presents to [facility] for   HISTORY OF PRESENT ILLNESS:    Chris Barry is a 36 year old man who presents to neurology clinic for initial evaluation and treatment of migraines.  Of note he had a head CT scan which I independently reviewed the images on his visit today and demonstrates evidence of chronic right cerebellar encephalomalacia which was followed up with a brain MRI scan on 5/21/2021 which re-demonstrated this finding which is thought to either be a chronic ischmia vs congenital developmental abnormality.  He reports a history of headaches starting in High School and he was evaluated at that time with head imaging and they informed him that this finding was present on his imaging and he had this further evaluated with a neurologist at that time.  He had repeated scanning for several years and since everything was stable this routine scanning was discontinued.  He tells me he had not had a headache in 15 years and in May 2021 had a severe migraine.  His headache was in the top of his head with a constant throbbing quality which he rates as 7/10 on pain scale 1-10 which lasted for about 5 hours.  He has not had a migraine since that time.  He had a concussion last year from saw falling on his head.  He has not been given anything for his migraines.  There is no family history of migraines.      Past Medical History:   Diagnosis Date   • Psoriasis         History reviewed. No pertinent family history.     Social History     Socioeconomic History   • Marital status: Single   Tobacco Use   • Smoking status: Never Smoker   • Smokeless tobacco: Never Used   Substance and Sexual Activity   • Alcohol use: No   • Drug use: No   • Sexual activity: Defer        I have personally reviewed the ROS as stated below.     Review of Systems   Constitutional:  "Negative for activity change, appetite change and fatigue.   HENT: Negative for trouble swallowing and voice change.    Eyes: Negative for blurred vision, double vision and pain.   Cardiovascular: Negative for leg swelling.   Gastrointestinal: Negative for anal bleeding, constipation, nausea and GERD.   Endocrine: Negative for cold intolerance and heat intolerance.   Genitourinary: Negative for decreased urine volume.   Musculoskeletal: Negative for back pain, gait problem and joint swelling.   Allergic/Immunologic: Negative for environmental allergies and food allergies.   Neurological: Positive for headache. Negative for dizziness, tremors, seizures, syncope, facial asymmetry, speech difficulty, weakness, light-headedness, numbness, memory problem and confusion.   Psychiatric/Behavioral: Negative for agitation, behavioral problems, decreased concentration, dysphoric mood, hallucinations, self-injury, sleep disturbance, suicidal ideas, negative for hyperactivity, depressed mood and stress. The patient is not nervous/anxious.         Objective   Vital Signs:   /80 (BP Location: Left arm, Patient Position: Sitting)   Pulse 70   Ht 177.8 cm (70\")   Wt 102 kg (224 lb)   SpO2 96%   BMI 32.14 kg/m²       PHYSICAL EXAM:    General   Mental Status - Alert. General Appearance - Well developed, Well groomed, Oriented and Cooperative. Orientation - Oriented X3.       Head and Neck  Head - normocephalic, atraumatic with no lesions or palpable masses.  Neck    Global Assessment - supple.       Eye   Sclera/Conjunctiva - Bilateral - Normal.    ENMT  Mouth and Throat   Oral Cavity/Oropharynx: Oropharynx - the soft palate,uvula and tongue are normal in appearance.    Chest and Lung Exam   Chest - lung clear to auscultation bilaterally.    Cardiovascular   Cardiovascular examination reveals  - normal heart sounds, regular rate and rhythm.    Neurologic   Mental Status: Speech - Normal. Cognitive function - appropriate " fund of knowledge. No impairment of attention, Impairment of concentration, impairment of long term memory or impairment of short term memory.  Cranial Nerves:   II Optic: Visual acuity - Left - Normal. Right - Normal. Visual fields - Normal (to confrontation).  III Oculomotor: Pupillary constriction - Left - Normal. Right - Normal.  VII Facial: - Normal Bilaterally.  VIII Acoustic - Bilateral - Hearing normal and (Hearing tested by finger rub).   IX Glossopharyngeal / X Vagus - Normal.  XI Accessory: Trapezius - Bilateral - Normal. Sternocleidomastoid - Bilateral - Normal.  XII Hypoglossal - Bilateral - Normal.  Eye Movements: - Normal Bilaterally.  Sensory:   Light Touch: Intact - Globally.  Motor:   Bulk and Contour: - Normal.  Tone: - Normal.  Tremor: Not present.  Strength: 5/5 normal muscle strength - All Muscles.   General Assessment of Reflexes: - deep tendon reflexes are normal. Coordination - No Impairment of finger-to-nose or Impairment of rapid alternating movements. Gait - Normal.       Result Review :                 Assessment and Plan    Problem List Items Addressed This Visit        Neuro    Migraine without aura and without status migrainosus, not intractable - Primary    Current Assessment & Plan     36 year old man with migraines.  Of note he had a head CT scan which I independently reviewed the images on his visit today and demonstrates evidence of chronic right cerebellar encephalomalacia which was followed up with a brain MRI scan on 5/21/2021 which re-demonstrated this finding which is thought to either be a chronic ischmia vs congenital developmental abnormality.  He reports a history of headaches starting in High School and he was evaluated at that time with head imaging and they informed him that this finding was present on his imaging and he had this further evaluated with a neurologist at that time.  He had repeated scanning for several years and since everything was stable this routine  scanning was discontinued.  He tells me he had not had a headache in 15 years and in May 2021 had a severe migraine.  His headache was in the top of his head with a constant throbbing quality which he rates as 7/10 on pain scale 1-10 which lasted for about 5 hours.  He has not had a migraine since that time.  He had a concussion last year from saw falling on his head.  He has not been given anything for his migraines.  There is no family history of migraines.  I will give him samples of Nurtec ODT that he can try in case he has another migraine as this is safe even in someone who may have had a stroke though at this time given the fact that the findings were present on scans when he was around 14 years old makes it more likely a developmental abnormality.  At this time as these are infrequent I would not start a preventative medicine.  I also discussed migraine triggers and lifestyle modifications at length.           Relevant Medications    Rimegepant Sulfate (Nurtec) 75 MG tablet dispersible tablet          This time today with patient includes time spent by me in the following activities:preparing for the visit, reviewing tests, obtaining and/or reviewing a separately obtained history, performing a medically appropriate examination and/or evaluation , counseling and educating the patient/family/caregiver, ordering medications, tests, or procedures, documenting information in the medical record, independently interpreting results and communicating that information with the patient/family/caregiver and care coordination    Follow Up   Return if symptoms worsen or fail to improve.  Patient was given instructions and counseling regarding his condition or for health maintenance advice. Please see specific information pulled into the AVS if appropriate.

## 2023-07-19 ENCOUNTER — APPOINTMENT (OUTPATIENT)
Dept: ULTRASOUND IMAGING | Facility: HOSPITAL | Age: 38
End: 2023-07-19
Payer: OTHER GOVERNMENT

## 2023-07-19 ENCOUNTER — HOSPITAL ENCOUNTER (EMERGENCY)
Facility: HOSPITAL | Age: 38
Discharge: HOME | End: 2023-07-19
Attending: EMERGENCY MEDICINE | Admitting: EMERGENCY MEDICINE
Payer: OTHER GOVERNMENT

## 2023-07-19 VITALS
DIASTOLIC BLOOD PRESSURE: 86 MMHG | HEART RATE: 81 BPM | HEIGHT: 70 IN | OXYGEN SATURATION: 96 % | TEMPERATURE: 97.5 F | BODY MASS INDEX: 29.35 KG/M2 | RESPIRATION RATE: 16 BRPM | WEIGHT: 205 LBS | SYSTOLIC BLOOD PRESSURE: 132 MMHG

## 2023-07-19 DIAGNOSIS — N45.3 EPIDIDYMOORCHITIS: Primary | ICD-10-CM

## 2023-07-19 LAB
BILIRUB UR QL STRIP: NEGATIVE
CLARITY UR: CLEAR
COLOR UR: YELLOW
GLUCOSE UR STRIP-MCNC: NEGATIVE MG/DL
HGB UR QL STRIP.AUTO: NEGATIVE
KETONES UR QL STRIP: NEGATIVE
LEUKOCYTE ESTERASE UR QL STRIP.AUTO: NEGATIVE
NITRITE UR QL STRIP: NEGATIVE
PH UR STRIP.AUTO: 5.5 [PH] (ref 5–8)
PROT UR QL STRIP: NEGATIVE
SP GR UR STRIP: 1.02 (ref 1–1.03)
UROBILINOGEN UR QL STRIP: NORMAL

## 2023-07-19 PROCEDURE — 93976 VASCULAR STUDY: CPT

## 2023-07-19 PROCEDURE — 76870 US EXAM SCROTUM: CPT

## 2023-07-19 PROCEDURE — 99283 EMERGENCY DEPT VISIT LOW MDM: CPT

## 2023-07-19 PROCEDURE — 81003 URINALYSIS AUTO W/O SCOPE: CPT | Performed by: EMERGENCY MEDICINE

## 2023-07-19 PROCEDURE — 25010000002 CEFTRIAXONE PER 250 MG: Performed by: EMERGENCY MEDICINE

## 2023-07-19 PROCEDURE — 96372 THER/PROPH/DIAG INJ SC/IM: CPT

## 2023-07-19 RX ORDER — DOXYCYCLINE 100 MG/1
100 CAPSULE ORAL 2 TIMES DAILY
Qty: 20 CAPSULE | Refills: 0 | Status: SHIPPED | OUTPATIENT
Start: 2023-07-19 | End: 2023-07-29

## 2023-07-19 RX ADMIN — LIDOCAINE HYDROCHLORIDE 500 MG: 10 INJECTION, SOLUTION EPIDURAL; INFILTRATION; INTRACAUDAL; PERINEURAL at 16:30

## 2023-07-19 NOTE — ED PROVIDER NOTES
EMERGENCY DEPARTMENT ENCOUNTER    Room Number:  10/10  PCP: Kellen Triana APRN      HPI:  Chief Complaint: Testicular pain  A complete HPI/ROS/PMH/PSH/SH/FH are unobtainable due to: None  Context: Chris Barry is a 37 y.o. male who presents to the ED c/o right testicular pain.  Onset about 2 days.  It is worse whenever he is moving around in his testicles will bump into his leg.  However it is persistent.  No fever.  No penile discharge.  He is sexually active with 1 partner.          PAST MEDICAL HISTORY  Active Ambulatory Problems     Diagnosis Date Noted    Migraine without aura and without status migrainosus, not intractable 12/03/2021     Resolved Ambulatory Problems     Diagnosis Date Noted    No Resolved Ambulatory Problems     Past Medical History:   Diagnosis Date    Psoriasis          PAST SURGICAL HISTORY  Past Surgical History:   Procedure Laterality Date    CYST REMOVAL      FOOT SURGERY Left     TOE SURGERY           FAMILY HISTORY  No family history on file.      SOCIAL HISTORY  Social History     Socioeconomic History    Marital status: Single   Tobacco Use    Smoking status: Never    Smokeless tobacco: Never   Substance and Sexual Activity    Alcohol use: No    Drug use: No    Sexual activity: Defer         ALLERGIES  Patient has no known allergies.        REVIEW OF SYSTEMS  Review of Systems     All systems reviewed and negative except for those discussed in HPI.       PHYSICAL EXAM  ED Triage Vitals   Temp Heart Rate Resp BP SpO2   07/19/23 1304 07/19/23 1304 07/19/23 1304 07/19/23 1306 07/19/23 1304   97.5 °F (36.4 °C) 81 16 139/85 96 %      Temp src Heart Rate Source Patient Position BP Location FiO2 (%)   07/19/23 1304 07/19/23 1304 07/19/23 1306 -- --   Tympanic Monitor Sitting         Physical Exam      GENERAL: no acute distress  HENT: nares patent  EYES: no scleral icterus  CV: regular rhythm, normal rate  RESPIRATORY: normal effort  : Right testicular swelling that is mild with  tenderness throughout the right testicle, normal external genitalia otherwise  MUSCULOSKELETAL: no deformity  NEURO: alert, moves all extremities, follows commands  PSYCH:  calm, cooperative  SKIN: warm, dry    Vital signs and nursing notes reviewed.          LAB RESULTS  No results found for this or any previous visit (from the past 24 hour(s)).    Ordered the above labs and reviewed the results.        RADIOLOGY  No Radiology Exams Resulted Within Past 24 Hours    Ordered the above noted radiological studies. Reviewed by me in PACS.          PROCEDURES  Procedures        MEDICATIONS GIVEN IN ER  Medications - No data to display      MEDICAL DECISION MAKING, PROGRESS, and CONSULTS    Discussion below represents my analysis of pertinent findings related to patient's condition, differential diagnosis, treatment plan and final disposition.      Orders placed during this visit:  Orders Placed This Encounter   Procedures    US Testicular or Ovarian Vascular Limited    US Scrotum & Testicles    Urinalysis With Microscopic If Indicated (No Culture) - Urine, Clean Catch    NPO Diet NPO Type: Strict NPO    Undress and Gown             Differential diagnosis:    Testicular torsion, epididymitis, orchitis, hydrocele/varicocele, hernia, UTI        Independent interpretation of labs, radiology studies, and discussions with consultants:  ED Course as of 07/19/23 1603   Wed Jul 19, 2023   1527 Leukocytes, UA: Negative [TD]   1527 Nitrite, UA: Negative [TD]      ED Course User Index  [TD] Derik Adan II, MD         Ultrasound imaging shows epididymoorchitis.  Although he seems to be low risk for STDs, given his age I think it is most appropriate to treat him with Rocephin and doxycycline.  Furthermore, doxycycline has better E. coli coverage based on our antibiogram than Levaquin anyways.      DIAGNOSIS  Final diagnoses:   Epididymoorchitis         DISPOSITION  DISCHARGE    FOLLOW-UP  No follow-up provider specified.        Medication List        New Prescriptions      doxycycline 100 MG capsule  Commonly known as: MONODOX  Take 1 capsule by mouth 2 (Two) Times a Day for 10 days.               Where to Get Your Medications        These medications were sent to [id number] - [place], [place] - [address] AT [facility] - [phone]  - [phone] FX  [address], [facility] [address]      Phone: [phone]   doxycycline 100 MG capsule             Latest Documented Vital Signs:  As of 13:48 EDT  BP- 139/85 HR- 81 Temp- 97.5 °F (36.4 °C) (Tympanic) O2 sat- 96%      --    Please note that portions of this were completed with a voice recognition program.       Note Disclaimer: At [facility], we believe that sharing information builds trust and better relationships. You are receiving this note because you are receiving care at [facility] or recently visited. It is possible you will see health information before a provider has talked with you about it. This kind of information can be easy to misunderstand. To help you fully understand what it means for your health, we urge you to discuss this note with your provider.         Derik Adan II, MD  07/19/23 0898

## 2025-03-25 ENCOUNTER — OFFICE VISIT (OUTPATIENT)
Dept: INTERNAL MEDICINE | Facility: CLINIC | Age: 40
End: 2025-03-25
Payer: OTHER GOVERNMENT

## 2025-03-25 VITALS
DIASTOLIC BLOOD PRESSURE: 82 MMHG | WEIGHT: 228.6 LBS | HEART RATE: 56 BPM | BODY MASS INDEX: 32.73 KG/M2 | HEIGHT: 70 IN | OXYGEN SATURATION: 98 % | SYSTOLIC BLOOD PRESSURE: 128 MMHG | RESPIRATION RATE: 18 BRPM

## 2025-03-25 DIAGNOSIS — M25.511 CHRONIC RIGHT SHOULDER PAIN: ICD-10-CM

## 2025-03-25 DIAGNOSIS — M79.621 PAIN IN BOTH UPPER ARMS: ICD-10-CM

## 2025-03-25 DIAGNOSIS — Z76.89 ENCOUNTER TO ESTABLISH CARE: Primary | ICD-10-CM

## 2025-03-25 DIAGNOSIS — M79.622 PAIN IN BOTH UPPER ARMS: ICD-10-CM

## 2025-03-25 DIAGNOSIS — G89.29 CHRONIC RIGHT SHOULDER PAIN: ICD-10-CM

## 2025-03-25 RX ORDER — APREMILAST 30 MG/1
30 TABLET, FILM COATED ORAL 2 TIMES DAILY
COMMUNITY

## 2025-03-25 NOTE — PROGRESS NOTES
Chief Complaint  Annual Exam    Subjective        Chris Barry presents to [facility]  History of Present Illness    Chris presents today to establish care and with concerns about shoulder and arm pain.  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 He enjoys his job and has been doing it for many years. He is  and has two children. He has not had a PCP in several years. He does get a yearly physical and lab work with the . He works closely with a PA who he discusses most health related things with currently. He takes Otezla for psoriasis which is prescribed by a  provider. He also takes a variety of supplements. He has a history of migraines as a child and has seen neurology in the past. He has undergone many CT scans that showed a stable chronic right cerebellar encephalomalacia (notes reviewed). He also a history of concussions due to football and work related injuries. He has not had a migraine since 2021 when he last saw the neurologist. When he does get a migraine it is severe and he cannot accomplish anything. He has no personal history of diabetes, HTN, HLD. He has a strong family history of cardiac disease and multiple family members who had a cardiac event at age 55 years old.    He reports a right shoulder injury that occurred in December of 2023 while ice skating with his daughter. She fell and to avoid falling on her he placed his arm backward to catch himself. He heard a pop at that time. The pain comes and goes with certain movements such as lifting his arm up above his head. The pain is located anteriorly. He does not take any medications for the pain. He has not undergone any imaging or PT.    He reports right and left bicep pain with the right being worse than the left. He states the pain has been present since December 2024. He seemed to have  "tendonitis at that time as well but that has resolved. He historically has participated in a physical fitness challenge that includes 100 pull-ups, 100-push ups, and additional physical activities. He always has soreness afterward but in December he felt the pain was different. It has worsened since then as well. He notices the pain with certain movements that primarily target the bicep muscles. He has difficulty even carrying around a laptop at work. He does not take any medications for the pain. He has not undergone any imaging or PT.       Objective   Vital Signs:  /82 (BP Location: Left arm, Patient Position: Sitting, Cuff Size: Adult)   Pulse 56   Resp 18   Ht 177.8 cm (70\")   Wt 104 kg (228 lb 9.6 oz)   SpO2 98%   BMI 32.80 kg/m²   Estimated body mass index is 32.8 kg/m² as calculated from the following:    Height as of this encounter: 177.8 cm (70\").    Weight as of this encounter: 104 kg (228 lb 9.6 oz).          Physical Exam  Vitals reviewed.   Constitutional:       Appearance: Normal appearance. He is not ill-appearing or toxic-appearing.   Cardiovascular:      Rate and Rhythm: Normal rate and regular rhythm.      Heart sounds: Normal heart sounds. No murmur heard.  Pulmonary:      Effort: Pulmonary effort is normal.      Breath sounds: Normal breath sounds. No wheezing or rales.   Musculoskeletal:        Arms:       Comments: Areas of pain noted above. No erythema or edema noted. Mild tenderness on palpation. Full ROM but with pain on flexion of elbow.   Skin:     General: Skin is warm.   Neurological:      Mental Status: He is alert.      Motor: No weakness.      Gait: Gait normal.   Psychiatric:         Mood and Affect: Mood normal.         Behavior: Behavior normal.         Thought Content: Thought content normal.         Judgment: Judgment normal.        Result Review :  The following data was reviewed by: ARGENTINA Fink on 03/25/2025:    Data reviewed : Reviewed chart.         "   Assessment and Plan   Diagnoses and all orders for this visit:    1. Encounter to establish care (Primary)    2. Pain in both upper arms  -     MRI Elbow Right Without Contrast; Future  -     MRI Elbow Left Without Contrast; Future  -     Ambulatory Referral to Orthopedic Surgery    3. Chronic right shoulder pain  -     MRI Shoulder Right Without Contrast; Future  -     Ambulatory Referral to Orthopedic Surgery    It was very nice to meet Chris today and establish care. I will order MRIs to further evaluate his pain and refer to orthopedic surgery. He will schedule an annual physical for sometime in the next couple months.         Follow Up   Return for Annual physical.  Patient was given instructions and counseling regarding his condition or for health maintenance advice. Please see specific information pulled into the AVS if appropriate.

## 2025-04-01 NOTE — PROGRESS NOTES
New Shoulder      Patient: Chris Barry        YOB: 1985    Medical Record Number: [id number]        Chief Complaints: Right shoulder bilateral elbow pain      History of Present Illness: This is a 39-year-old male who is right-hand dominant presents of right shoulder and right elbow pain his pain in the elbows is really more in the area of the biceps is in the muscle belly of the biceps himself he states he was doing pull-ups when he noticed pain in the biceps his shoulder has been periodically over time much worse recently he has a hard time working out.  He is in the  shoulder pain is anterior and posterior.  He does come with an MRI of all 3.  MRI of his shoulder demonstrates some tendinopathy of the rotator cuff with a small interstitial tear he does have downsloping of the acromion MRI of the right elbow demonstrates some tendinopathy of the biceps tendon MRI of the left elbow demonstrates some signal changes within the ulnar  nerve which might represent ulnar neuritis otherwise normal exam      Allergies:   Allergies   Allergen Reactions    Azithromycin Nausea And Vomiting     Other reaction(s): Urticaria (Hives)       Medications:   Home Medications:  Current Outpatient Medications on File Prior to Visit   Medication Sig    Apremilast (Otezla) 30 MG tablet Take 30 mg by mouth 2 (Two) Times a Day.    Zoryve 0.3 % cream Apply thin layer TO affected AREAS ONCE daily AS needed FOR flairs     No current facility-administered medications on file prior to visit.     Current Medications:  Scheduled Meds:  Continuous Infusions:No current facility-administered medications for this visit.    PRN Meds:.    Past Medical History:   Diagnosis Date    Psoriasis         Past Surgical History:   Procedure Laterality Date    CYST REMOVAL      FOOT SURGERY Left     TOE SURGERY          Social History     Occupational History    Not on file   Tobacco Use    Smoking status: Never    Smokeless tobacco:  "Never   Vaping Use    Vaping status: Never Used   Substance and Sexual Activity    Alcohol use: Yes     Comment: socially    Drug use: No    Sexual activity: Defer      Social History     Social History Narrative    Not on file        Family History   Problem Relation Age of Onset    Heart attack Father              Review of Systems:     Review of Systems      Physical Exam: 39 y.o. male  General Appearance:    Alert, cooperative, in no acute distress                   Vitals:    05/05/25 1353   Temp: 98 °F (36.7 °C)   TempSrc: Temporal   Weight: 101 kg (222 lb 8 oz)   Height: 175.3 cm (69\")   PainSc: 1    PainLoc: Shoulder      Patient is alert and read ×3 no acute distress appears her above-listed at height weight and age.  Affect is normal respiratory rate is normal unlabored. Heart rate regular rate rhythm, sclera, dentition and hearing are normal for the purpose of this exam.    Ortho Exam  Physical exam of the right shoulder reveals no overlying skin changes no lymphedema no lymphadenopathy.  Patient has active flexion 180 with mild symptoms abduction is similar external rotation is to 50 and internal rotation to the upper lumbar spine with mild symptoms.  Patient has good rotator cuff strength 4+ over 5 with isometric strength testing with pain.  Patient has a positive impingement and a positive Ramirez sign.  Patient has good cervical range of motion which is full and asymptomatic no radicular symptoms.  .  Good distal pulses are present  Patient has pain with overhead activity and a positive Neer sign and a positive empty can sign , a positive drop arm and a definitive painful arc       He has subjective pain in the muscle belly of both biceps tendons he has pain with resisted elbow flexion and resisted forearm supination all localized to the muscle belly of the biceps tendon.  You can palpate his tendons distally I feel like those are intact he has no obvious deformity he has a negative Tinel's at both " The patient is seen for Dr. DONA Frankel. He is awake and alert this am. Breathing comfortably. He offers no complaints. He is not having any bleeding.    CHEMOTHERAPY REGIMEN:        Day:                          Diet:  Protocol:                                    IVF:      MEDICATIONS  (STANDING):  sodium chloride 0.9% lock flush 3milliLiter(s) IV Push every 8 hours  tamsulosin 0.4milliGRAM(s) Oral at bedtime  mirtazapine 15milliGRAM(s) Oral at bedtime  atorvastatin 40milliGRAM(s) Oral at bedtime  calcium acetate 667milliGRAM(s) Oral three times a day with meals  sevelamer hydrochloride 800milliGRAM(s) Oral daily  pantoprazole    Tablet 40milliGRAM(s) Oral before breakfast  multivitamin 1Tablet(s) Oral daily  predniSONE   Tablet 5milliGRAM(s) Oral daily  midodrine 5milliGRAM(s) Oral daily    MEDICATIONS  (PRN):      Allergies    No Known Allergies    Intolerances        DVT Prophylaxis: [ ] YES [ ] NO      Antibiotics: [ ] YES [ ] NO    Pain Scale (1-10):       Location:    Vital Signs Last 24 Hrs  T(C): 36.7, Max: 36.9 (06-03 @ 17:11)  T(F): 98, Max: 98.4 (06-03 @ 17:11)  HR: 68 (64 - 98)  BP: 116/58 (85/50 - 121/59)  BP(mean): 84 (63 - 84)  RR: 16 (15 - 16)  SpO2: 95% (95% - 99%)    Drug Dosing Weight  Height (cm): 182.9 (03 Jun 2017 00:44)  Weight (kg): 64.4 (03 Jun 2017 00:44)  BMI (kg/m2): 19.3 (03 Jun 2017 00:44)  BSA (m2): 1.84 (03 Jun 2017 00:44)    PHYSICAL EXAM:      Constitutional: non-complaining.  Eyes: conjunctival pallor, ? scleral icterus in poor light.  ENMT: buccal mucosa moist. oropharynx clear.  Neck: no masses.  Back: no SPT.  Respiratory: decreased breath sounds at the left base with dullness on percussion. Right chest clear.  Cardiovascular: S1>S2 at apex. Irregular rhythm. ELEONORA at the left sternal border and a more blowing quality murmur is appreciated more laterally.  Gastrointestinal: soft, nontender, active bowel sounds. No palp masses.  Genitourinary: voiding little as he is on HD.  Extremities: no leg edema.  Vascular: radial pulses equal bilat.  Neurological: no gross focal deficits.  Skin: warm and dry.  Lymph Nodes: none palp.  Musculoskeletal: full ROM.  Psychiatric: affect normal.        URINARY CATHETER: [ ] YES [x ] NO     LABS:  CBC Full  -  ( 04 Jun 2017 05:58 )  WBC Count : 4.5 K/uL  Hemoglobin : 8.6 g/dL  Hematocrit : 27.8 %  Platelet Count - Automated : 44 K/uL  Mean Cell Volume : 99.3 fL  Mean Cell Hemoglobin : 30.7 pg  Mean Cell Hemoglobin Concentration : 30.9 g/dL  Auto Neutrophil # : x  Auto Lymphocyte # : x  Auto Monocyte # : x  Auto Eosinophil # : x  Auto Basophil # : x  Auto Neutrophil % : x  Auto Lymphocyte % : x  Auto Monocyte % : x  Auto Eosinophil % : x  Auto Basophil % : x    06-03    139  |  98  |  31<H>  ----------------------------<  95  4.0   |  26  |  5.80<H>    Ca    6.5<LL>      03 Jun 2017 00:20    TPro  5.1<L>  /  Alb  2.9<L>  /  TBili  1.8<H>  /  DBili  x   /  AST  186<H>  /  ALT  224<H>  /  AlkPhos  112  06-03    PT/INR - ( 03 Jun 2017 00:20 )   PT: 17.0 sec;   INR: 1.52          PTT - ( 03 Jun 2017 00:20 )  PTT:36.4 sec      CULTURES:    RADIOLOGY & ADDITIONAL STUDIES: elbow  Large Joint Arthrocentesis: R subacromial bursa  Date/Time: 5/5/2025 2:25 PM  Consent given by: patient  Site marked: site marked  Timeout: Immediately prior to procedure a time out was called to verify the correct patient, procedure, equipment, support staff and site/side marked as required   Supporting Documentation  Indications: pain   Procedure Details  Location: shoulder - R subacromial bursa  Preparation: Patient was prepped and draped in the usual sterile fashion  Needle gauge: 21G.  Approach: posterior  Medications administered: 2 mL lidocaine PF 1% 1 %; 40 mg methylPREDNISolone acetate 40 MG/ML  Patient tolerance: patient tolerated the procedure well with no immediate complications              Radiology:   AP, Scapular Y and Axillary Lateral of the right shoulder were ordered/reviewed to evauate shoulder pain.  I have no comparative films he has some mild narrowing of the acromioclavicular joint otherwise no acute bony pathology right and left elbow AP and lateral no comparative films these are normal as well.  MRIs are as above I have reviewed and agree  Imaging Results (Most Recent)       Procedure Component Value Units Date/Time    XR Shoulder 2+ View Right [[id number]] Resulted: 05/05/25 1435     Updated: 05/05/25 1435    Impression:      Ordering physician's impression is located in the Encounter Note dated 05/05/25. X-ray performed in the DR room.      XR Elbow 2 View Right [[id number]] Resulted: 05/05/25 1435     Updated: 05/05/25 1435    Impression:      Ordering physician's impression is located in the Encounter Note dated 05/05/25. X-ray performed in the DR room.            Assessment/Plan: Right shoulder pain I think this is impingement I think you benefit from an injection as a diagnostic and therapeutic tool I will start him on some meloxicam with strict precautions for short period of time and have him see physical therapy.  As far as the biceps tendon goes it seems to be a true muscle  injury with no obvious deformity I told him there is nothing surgical to do.  I will have physical therapy see him for this we talked about things to avoid including different weight lifting activities.  If we fail to relieve his symptoms with all of this including the Mobic I will have him see Dr. Mcbride for ultrasound guided evaluation possible PRP  Cortisone Injection. See procedure note.  Cortisone Injection for DIAGNOSTIC and THERAPUTIC purposes.

## 2025-04-25 ENCOUNTER — TELEPHONE (OUTPATIENT)
Dept: INTERNAL MEDICINE | Facility: CLINIC | Age: 40
End: 2025-04-25

## 2025-04-25 NOTE — TELEPHONE ENCOUNTER
Caller: Chris Barry    Relationship: Self    Best call back number: [phone]     What is the medical concern/diagnosis: PSORIASIS    What specialty or service is being requested: DERMATOLOGY    What is the provider, practice or medical service name: [facility]     What is the office location: [facility]     What is the office phone number: FAX #  [phone]    Any additional details: PATIENT IS NEEDING TO HAVE THIS REFERRAL SENT IN SO HE CAN HAVE HIS ANNUAL CHECKUP FOR MEDICATION REFILLS.

## 2025-04-28 ENCOUNTER — HOSPITAL ENCOUNTER (OUTPATIENT)
Dept: MRI IMAGING | Facility: HOSPITAL | Age: 40
Discharge: HOME | End: 2025-04-28
Payer: OTHER GOVERNMENT

## 2025-04-28 DIAGNOSIS — M25.511 CHRONIC RIGHT SHOULDER PAIN: ICD-10-CM

## 2025-04-28 DIAGNOSIS — G89.29 CHRONIC RIGHT SHOULDER PAIN: ICD-10-CM

## 2025-04-28 DIAGNOSIS — M79.621 PAIN IN BOTH UPPER ARMS: ICD-10-CM

## 2025-04-28 DIAGNOSIS — L40.9 PSORIASIS: Primary | ICD-10-CM

## 2025-04-28 DIAGNOSIS — M79.622 PAIN IN BOTH UPPER ARMS: ICD-10-CM

## 2025-04-28 PROCEDURE — 73221 MRI JOINT UPR EXTREM W/O DYE: CPT

## 2025-04-28 NOTE — TELEPHONE ENCOUNTER
I spoke with Chris and scheduled his annual for tomorrow. He was wanting to know   if  Nataliya  could place referral to dermatology for the psoriasis as he has been seeing this dermatologist for years and wants to see her for this concern. He is currently already scheduled with dermatology for tomorrow regarding this concern and doesn't want to cancel that as it will be hard for him to get back in to see them for an appointment

## 2025-04-29 ENCOUNTER — HOSPITAL ENCOUNTER (OUTPATIENT)
Facility: HOSPITAL | Age: 40
Discharge: HOME | End: 2025-04-29
Payer: OTHER GOVERNMENT

## 2025-04-29 ENCOUNTER — OFFICE VISIT (OUTPATIENT)
Dept: INTERNAL MEDICINE | Facility: CLINIC | Age: 40
End: 2025-04-29
Payer: OTHER GOVERNMENT

## 2025-04-29 ENCOUNTER — TRANSCRIBE ORDERS (OUTPATIENT)
Dept: ADMINISTRATIVE | Facility: HOSPITAL | Age: 40
End: 2025-04-29
Payer: OTHER GOVERNMENT

## 2025-04-29 VITALS
DIASTOLIC BLOOD PRESSURE: 86 MMHG | BODY MASS INDEX: 32.21 KG/M2 | SYSTOLIC BLOOD PRESSURE: 128 MMHG | WEIGHT: 225 LBS | OXYGEN SATURATION: 96 % | HEART RATE: 71 BPM | HEIGHT: 70 IN

## 2025-04-29 DIAGNOSIS — M79.622 PAIN IN BOTH UPPER ARMS: ICD-10-CM

## 2025-04-29 DIAGNOSIS — E55.9 VITAMIN D DEFICIENCY: ICD-10-CM

## 2025-04-29 DIAGNOSIS — Z00.00 ENCOUNTER FOR WELL ADULT EXAM WITHOUT ABNORMAL FINDINGS: Primary | ICD-10-CM

## 2025-04-29 DIAGNOSIS — Z11.59 ENCOUNTER FOR HEPATITIS C SCREENING TEST FOR LOW RISK PATIENT: ICD-10-CM

## 2025-04-29 DIAGNOSIS — R45.86 MOOD CHANGES: ICD-10-CM

## 2025-04-29 DIAGNOSIS — M79.621 PAIN IN BOTH UPPER ARMS: ICD-10-CM

## 2025-04-29 DIAGNOSIS — L40.9 PSORIASIS: ICD-10-CM

## 2025-04-29 DIAGNOSIS — M79.621 PAIN IN BOTH UPPER ARMS: Primary | ICD-10-CM

## 2025-04-29 DIAGNOSIS — M79.622 PAIN IN BOTH UPPER ARMS: Primary | ICD-10-CM

## 2025-04-29 PROCEDURE — 99395 PREV VISIT EST AGE 18-39: CPT

## 2025-04-29 NOTE — PROGRESS NOTES
"Chief Complaint  Follow-up (Physical)      Subjective    {Problem List  Visit Diagnosis   Encounters  Notes  Medications  Labs  Result Review Imaging  Media :23}    Chris Barry presents to [facility]    History of Present Illness        Objective   Vital Signs:  /86 (BP Location: Left arm, Patient Position: Sitting, Cuff Size: Adult)   Pulse 71   Ht 177.8 cm (70\")   Wt 102 kg (225 lb)   SpO2 96%   BMI 32.28 kg/m²   Estimated body mass index is 32.28 kg/m² as calculated from the following:    Height as of this encounter: 177.8 cm (70\").    Weight as of this encounter: 102 kg (225 lb).    {BMI is >= 30 and <35. (Class 1 Obesity). The following options were offered after discussion; (Optional):66477}        Physical Exam   Result Review :{Labs  Result Review  Imaging  Med Tab  Media  Procedures :23}  {The following data was reviewed by (Optional):73140}  {Ambulatory Labs (Optional):13842}  {Data reviewed (Optional):99950:::1}             Assessment and Plan {CC Problem List  Visit Diagnosis   ROS  Review (Popup)  Health Maintenance  Quality  BestPractice  Medications  SmartSets  SnapShot Encounters  Media :23}  There are no diagnoses linked to this encounter.         Assessment & Plan         {Time Spent (Optional):27150}  Follow Up {Instructions Charge Capture  Follow-up Communications :23}  No follow-ups on file.  Patient was given instructions and counseling regarding his condition or for health maintenance advice. Please see specific information pulled into the AVS if appropriate.           Nataliya López, APRN   10:51 EDT   [unfilled]     {[facility] [employer] Provider Statement:94457}    "

## 2025-04-29 NOTE — PROGRESS NOTES
Chief Complaint  Follow-up (Physical)    Subjective        Chris Barry presents to [facility]  History of Present Illness    Chris presents today for his annual  physical. Overall things are going well and he has no concerns. He got his MRIs done yesterday for his shoulder and biceps, but was notified today that he needed to return to repeat the MRIs of his biceps. He will be going to repeat those this afternoon. He has an appointment with the ortho surgeon on Monday.    He is getting routine exercise and weight lifting. He is able to contribute certain exercises despite his shoulder and bicep pain. He lifts more with his triceps than his biceps. He is eating well. He drinks a gallon of water daily. He drinks about 3 cups of coffee per day. He eliminated energy drinks. He states the caffiene helps with his heart rate. He reports a history of bradycardia with a HR routinely in the 40's. He reports no dizziness or lightheadedness with his low resting HR. He is sleeping well. He has no trouble with urinating or stooling. He is going  to the dentist routinely. He wears reading glasses and has been seeing an optometrist through the  but will need to establish with someone new. He is asking for a referral but will check with insurance first.     He has a history of psoriasis and has been seeing a dermatologist routinely. Because he has switched primary care providers they required a referral. He had an appointment this morning. He is taking Otezla which is managing his psoriasis. He still has flare ups when he has an intense lack of sleep, intense illness, or sometimes after consuming dairy. He has never been diagnosed with a dairy allergy but he usually avoids it.    He has a hx of migraines. He has not had one in a long time. He is not currently seeing neurology.    He reports mild mood changes. He inquires about his testosterone levels. He reports getting  "emotional and crying more recently which he states is not normal for him. For example, he cried while watching a Deandre movie with his daughter. No fatigue. He tries to manage through lifestyle such as ice baths, exercise, and diet.    He reports no sadness, depression, or anxiety. Although his job can be stressful, he manages it well. He does not smoke or use tobacco products. He rarely has a cigar. He rarely drinks alcohol. He does not use marijuana.    He has no personal history of diabetes, HTN, HLD. He has a strong family history of cardiac disease and multiple family members who had a cardiac event at age 55 years old. Denies headache, chest pain, dizziness, blurry vision, leg swelling. He did have one episode of blurry vision yesterday after trying a new pre-workout and then performing strenuous exercise and ice bath.     Objective   Vital Signs:  /86 (BP Location: Left arm, Patient Position: Sitting, Cuff Size: Adult)   Pulse 71   Ht 177.8 cm (70\")   Wt 102 kg (225 lb)   SpO2 96%   BMI 32.28 kg/m²   Estimated body mass index is 32.28 kg/m² as calculated from the following:    Height as of this encounter: 177.8 cm (70\").    Weight as of this encounter: 102 kg (225 lb).    BMI is >= 30 and <35. (Class 1 Obesity). The following options were offered after discussion;: exercise counseling/recommendations and nutrition counseling/recommendations      Physical Exam  Vitals reviewed.   Constitutional:       Appearance: Normal appearance. He is well-developed and normal weight.   HENT:      Head: Normocephalic.      Right Ear: Tympanic membrane, ear canal and external ear normal.      Left Ear: Tympanic membrane, ear canal and external ear normal.      Nose: Nose normal.      Mouth/Throat:      Lips: Pink.      Mouth: Mucous membranes are moist.      Tongue: No lesions.      Pharynx: Oropharynx is clear. No posterior oropharyngeal erythema.   Eyes:      Extraocular Movements: Extraocular movements intact. "      Conjunctiva/sclera: Conjunctivae normal.      Pupils: Pupils are equal, round, and reactive to light.   Cardiovascular:      Rate and Rhythm: Normal rate and regular rhythm.      Heart sounds: Normal heart sounds. No murmur heard.  Pulmonary:      Effort: Pulmonary effort is normal.      Breath sounds: Normal breath sounds.   Abdominal:      General: Abdomen is flat. Bowel sounds are normal.      Palpations: Abdomen is soft.      Tenderness: There is no abdominal tenderness.   Musculoskeletal:         General: Normal range of motion.      Cervical back: Normal range of motion.      Right lower leg: No edema.      Left lower leg: No edema.   Lymphadenopathy:      Cervical: No cervical adenopathy.   Skin:     General: Skin is warm.   Neurological:      General: No focal deficit present.      Mental Status: He is alert.      Coordination: Coordination is intact.      Gait: Gait is intact.   Psychiatric:         Attention and Perception: Attention normal.         Mood and Affect: Mood normal.         Speech: Speech normal.         Behavior: Behavior normal.         Thought Content: Thought content normal.         Judgment: Judgment normal.        Result Review :  The following data was reviewed by: ARGENTINA Fink on 04/29/2025:    Data reviewed : Reviewed chart.           Assessment and Plan   Diagnoses and all orders for this visit:    1. Encounter for well adult exam without abnormal findings (Primary)  -     Urinalysis With Microscopic - Urine, Clean Catch  -     TSH  -     CBC & Differential  -     Lipid Panel  -     Hemoglobin A1c  -     Comprehensive Metabolic Panel  -     Vitamin D,25-Hydroxy  -     Hepatitis C antibody    2. Vitamin D deficiency  -     Vitamin D,25-Hydroxy    3. Encounter for hepatitis C screening test for low risk patient  -     Hepatitis C antibody    4. Mood changes  -     Testosterone    5. Psoriasis      Chris is doing well overall. He has an appointment with the orthopedic  surgeon next week. We discussed his MRI results of his shoulder. He will be getting the MRIs of his biceps today. We will get routine labs today and I will notify him of the results. A referral was sent to the dermatologist. We will plan to follow up in 1 year for his annual physical.         Follow Up   Return in about 1 year (around 4/29/2026) for Annual physical.  Patient was given instructions and counseling regarding his condition or for health maintenance advice. Please see specific information pulled into the AVS if appropriate.

## 2025-04-30 LAB
25(OH)D3+25(OH)D2 SERPL-MCNC: 39.1 NG/ML (ref 30–100)
ALBUMIN SERPL-MCNC: 4.5 G/DL (ref 3.5–5.2)
ALBUMIN/GLOB SERPL: 1.6 G/DL
ALP SERPL-CCNC: 79 U/L (ref 39–117)
ALT SERPL-CCNC: 41 U/L (ref 1–41)
AST SERPL-CCNC: 28 U/L (ref 1–40)
BASOPHILS # BLD AUTO: 0.07 10*3/MM3 (ref 0–0.2)
BASOPHILS NFR BLD AUTO: 0.9 % (ref 0–1.5)
BILIRUB SERPL-MCNC: 0.5 MG/DL (ref 0–1.2)
BUN SERPL-MCNC: 20 MG/DL (ref 6–20)
BUN/CREAT SERPL: 17.5 (ref 7–25)
CALCIUM SERPL-MCNC: 10 MG/DL (ref 8.6–10.5)
CHLORIDE SERPL-SCNC: 104 MMOL/L (ref 98–107)
CHOLEST SERPL-MCNC: 175 MG/DL (ref 0–200)
CO2 SERPL-SCNC: 25.6 MMOL/L (ref 22–29)
CREAT SERPL-MCNC: 1.14 MG/DL (ref 0.76–1.27)
EGFRCR SERPLBLD CKD-EPI 2021: 84.9 ML/MIN/1.73
EOSINOPHIL # BLD AUTO: 0.22 10*3/MM3 (ref 0–0.4)
EOSINOPHIL NFR BLD AUTO: 2.8 % (ref 0.3–6.2)
ERYTHROCYTE [DISTWIDTH] IN BLOOD BY AUTOMATED COUNT: 13 % (ref 12.3–15.4)
GLOBULIN SER CALC-MCNC: 2.9 GM/DL
GLUCOSE SERPL-MCNC: 86 MG/DL (ref 65–99)
HBA1C MFR BLD: 5.5 % (ref 4.8–5.6)
HCT VFR BLD AUTO: 42.5 % (ref 37.5–51)
HCV IGG SERPL QL IA: NON REACTIVE
HDLC SERPL-MCNC: 29 MG/DL (ref 40–60)
HGB BLD-MCNC: 14.4 G/DL (ref 13–17.7)
IMM GRANULOCYTES # BLD AUTO: 0.03 10*3/MM3 (ref 0–0.05)
IMM GRANULOCYTES NFR BLD AUTO: 0.4 % (ref 0–0.5)
LDLC SERPL CALC-MCNC: 87 MG/DL (ref 0–100)
LYMPHOCYTES # BLD AUTO: 2.39 10*3/MM3 (ref 0.7–3.1)
LYMPHOCYTES NFR BLD AUTO: 30.1 % (ref 19.6–45.3)
MCH RBC QN AUTO: 29.7 PG (ref 26.6–33)
MCHC RBC AUTO-ENTMCNC: 33.9 G/DL (ref 31.5–35.7)
MCV RBC AUTO: 87.6 FL (ref 79–97)
MONOCYTES # BLD AUTO: 0.68 10*3/MM3 (ref 0.1–0.9)
MONOCYTES NFR BLD AUTO: 8.6 % (ref 5–12)
NEUTROPHILS # BLD AUTO: 4.56 10*3/MM3 (ref 1.7–7)
NEUTROPHILS NFR BLD AUTO: 57.2 % (ref 42.7–76)
NRBC BLD AUTO-RTO: 0 /100 WBC (ref 0–0.2)
PLATELET # BLD AUTO: 333 10*3/MM3 (ref 140–450)
POTASSIUM SERPL-SCNC: 4.4 MMOL/L (ref 3.5–5.2)
PROT SERPL-MCNC: 7.4 G/DL (ref 6–8.5)
RBC # BLD AUTO: 4.85 10*6/MM3 (ref 4.14–5.8)
SODIUM SERPL-SCNC: 142 MMOL/L (ref 136–145)
TESTOST SERPL-MCNC: 220 NG/DL (ref 264–916)
TRIGL SERPL-MCNC: 356 MG/DL (ref 0–150)
TSH SERPL DL<=0.005 MIU/L-ACNC: 1.55 UIU/ML (ref 0.27–4.2)
UNABLE TO VOID: NORMAL
VLDLC SERPL CALC-MCNC: 59 MG/DL (ref 5–40)
WBC # BLD AUTO: 7.95 10*3/MM3 (ref 3.4–10.8)

## 2025-05-02 ENCOUNTER — TELEPHONE (OUTPATIENT)
Dept: INTERNAL MEDICINE | Facility: CLINIC | Age: 40
End: 2025-05-02
Payer: OTHER GOVERNMENT

## 2025-05-02 NOTE — TELEPHONE ENCOUNTER
Chris called and was returning Binals call to go over lab results. I let him know that she was with a patient an di would have her give him a call back!

## 2025-05-05 ENCOUNTER — TELEPHONE (OUTPATIENT)
Dept: INTERNAL MEDICINE | Facility: CLINIC | Age: 40
End: 2025-05-05

## 2025-05-05 ENCOUNTER — OFFICE VISIT (OUTPATIENT)
Dept: ORTHOPEDIC SURGERY | Facility: CLINIC | Age: 40
End: 2025-05-05
Payer: OTHER GOVERNMENT

## 2025-05-05 VITALS — HEIGHT: 69 IN | TEMPERATURE: 98 F | BODY MASS INDEX: 32.95 KG/M2 | WEIGHT: 222.5 LBS

## 2025-05-05 DIAGNOSIS — S46.211A STRAIN OF RIGHT BICEPS, INITIAL ENCOUNTER: Primary | ICD-10-CM

## 2025-05-05 DIAGNOSIS — M75.41 IMPINGEMENT SYNDROME OF RIGHT SHOULDER: ICD-10-CM

## 2025-05-05 RX ORDER — ROFLUMILAST 3 MG/G
CREAM TOPICAL
COMMUNITY
Start: 2025-04-30

## 2025-05-05 RX ORDER — MELOXICAM 15 MG/1
TABLET ORAL
Qty: 30 TABLET | Refills: 0 | Status: SHIPPED | OUTPATIENT
Start: 2025-05-05

## 2025-05-05 RX ADMIN — METHYLPREDNISOLONE ACETATE 40 MG: 40 INJECTION, SUSPENSION INTRA-ARTICULAR; INTRALESIONAL; INTRAMUSCULAR; SOFT TISSUE at 14:25

## 2025-05-05 RX ADMIN — LIDOCAINE HYDROCHLORIDE 2 ML: 10 INJECTION, SOLUTION EPIDURAL; INFILTRATION; INTRACAUDAL; PERINEURAL at 14:25

## 2025-05-05 NOTE — TELEPHONE ENCOUNTER
Caller: Chris Barry    Relationship: Self    Best call back number: [phone]      What was the call regarding: PATIENT CALLING WANTED TO SCHEDULE LABS BUT THERE ARE NO ACTIVE LABS IN CHART    IF PATIENT NEEDS LABS PLEASE GIVE CALLBACK TO LET HIM KNOW THATAPPOINTMENT

## 2025-05-06 DIAGNOSIS — G56.22 NEURITIS OF LEFT ULNAR NERVE: ICD-10-CM

## 2025-05-06 DIAGNOSIS — M25.511 CHRONIC RIGHT SHOULDER PAIN: Primary | ICD-10-CM

## 2025-05-06 DIAGNOSIS — M67.921 TENDINOPATHY OF RIGHT BICEPS TENDON: ICD-10-CM

## 2025-05-06 DIAGNOSIS — G89.29 CHRONIC RIGHT SHOULDER PAIN: Primary | ICD-10-CM

## 2025-05-06 DIAGNOSIS — R45.86 MOOD CHANGES: Primary | ICD-10-CM

## 2025-05-06 DIAGNOSIS — M79.621 PAIN IN BOTH UPPER ARMS: ICD-10-CM

## 2025-05-06 DIAGNOSIS — E78.1 HYPERTRIGLYCERIDEMIA: ICD-10-CM

## 2025-05-06 DIAGNOSIS — M79.622 PAIN IN BOTH UPPER ARMS: ICD-10-CM

## 2025-05-06 RX ORDER — METHYLPREDNISOLONE ACETATE 40 MG/ML
40 INJECTION, SUSPENSION INTRA-ARTICULAR; INTRALESIONAL; INTRAMUSCULAR; SOFT TISSUE
Status: COMPLETED | OUTPATIENT
Start: 2025-05-05 | End: 2025-05-05

## 2025-05-06 RX ORDER — LIDOCAINE HYDROCHLORIDE 10 MG/ML
2 INJECTION, SOLUTION EPIDURAL; INFILTRATION; INTRACAUDAL; PERINEURAL
Status: COMPLETED | OUTPATIENT
Start: 2025-05-05 | End: 2025-05-05

## 2025-05-16 DIAGNOSIS — R79.89 LOW TESTOSTERONE: Primary | ICD-10-CM

## 2025-05-16 DIAGNOSIS — R45.86 MOOD CHANGES: ICD-10-CM

## 2025-05-30 ENCOUNTER — TELEPHONE (OUTPATIENT)
Dept: ORTHOPEDIC SURGERY | Facility: CLINIC | Age: 40
End: 2025-05-30

## 2025-05-30 ENCOUNTER — TELEPHONE (OUTPATIENT)
Dept: ORTHOPEDIC SURGERY | Facility: CLINIC | Age: 40
End: 2025-05-30
Payer: OTHER GOVERNMENT

## 2025-05-30 DIAGNOSIS — S46.299D: Primary | ICD-10-CM

## 2025-05-30 NOTE — TELEPHONE ENCOUNTER
Caller: JESSICA    Relationship: [facility]    Best call back number: [phone]    What was the call regarding: JESSICA WITH [facility] CALLING BACK TO RELAY THAT SHE RECEIVED A DUPLICATE OF THE PHYSICAL THERAPY REFERRAL AND NOT THE DOPPLER ORDER AND  AUTH. JESSICA REQUESTING THE DOPPLER ORDER FROM DR TURNER AND THE  AUTH THAT CORRESPONDS TO THE ORDER. PLEASE CONTACT JESSICA AT THE NUMBER ABOVE WITH ANY QUESTIONS.     [facility] [phone]

## 2025-05-30 NOTE — TELEPHONE ENCOUNTER
Caller: JSESICA    Relationship to patient: [facility]    Best call back number: [phone]    Patient is needing: JESSICA IS CALLING TO SEE IF THE  AUTH HAS BEEN SENT FOR THE ULTRASOUND.. PLEASE ADVISE..

## 2025-05-30 NOTE — TELEPHONE ENCOUNTER
Caller: SALTY HARGROVE    Relationship: PHYSICAL THERAPIST    Best call back number: [phone]    What orders are you requesting (i.e. lab or imaging): ULTRA SOUND    In what timeframe would the patient need to come in: ASAP    Where will you receive your lab/imaging services: [facility]     Additional notes: SPOKE WITH SALTY-SHE STATES THAT SHE NEEDS TO HAVE A REFERRAL SENT OVER FOR A musculoskeletal   ULTRA SOUND    SHE STATES THAT IT NEEDS  TO BE FOR BOTH BICEPS AND THE RIGHT SHOULDER      EMAIL- [email or web address]    UNABLE TO FAX    PLEASE ADVISE ONCE COMPLETE

## 2025-06-09 ENCOUNTER — TELEPHONE (OUTPATIENT)
Dept: ORTHOPEDIC SURGERY | Facility: CLINIC | Age: 40
End: 2025-06-09
Payer: OTHER GOVERNMENT

## 2025-06-09 NOTE — TELEPHONE ENCOUNTER
Provider: JOHNNY    Caller: ROMAN LOREDO P/ELÍAS    Phone Number: [phone]*    Reason for Call: ROMAN REQUESTING MRI RESULTS FOR RT SHOULDER FAXED [phone].